# Patient Record
Sex: FEMALE | Race: WHITE
[De-identification: names, ages, dates, MRNs, and addresses within clinical notes are randomized per-mention and may not be internally consistent; named-entity substitution may affect disease eponyms.]

---

## 2021-02-09 ENCOUNTER — HOSPITAL ENCOUNTER (OUTPATIENT)
Dept: HOSPITAL 47 - RADUSWWP | Age: 61
Discharge: HOME | End: 2021-02-09
Attending: INTERNAL MEDICINE
Payer: OTHER GOVERNMENT

## 2021-02-09 DIAGNOSIS — R93.89: Primary | ICD-10-CM

## 2021-02-09 DIAGNOSIS — N93.9: ICD-10-CM

## 2021-02-09 PROCEDURE — 76856 US EXAM PELVIC COMPLETE: CPT

## 2021-02-09 NOTE — US
EXAMINATION TYPE: US pelvic complete

 

DATE OF EXAM: 2/9/2021

 

COMPARISON: NONE

 

CLINICAL HISTORY: N93.9 ABN UTERINE AND VAGINAL BLEEDING. Pt states 2 episodes of vaginal spotting, l
ast episode 1 week ago

 

TECHNIQUE:  Transabdominal (TA).  Transabdominal sonographic images of the pelvis were acquired.  

 

Date of LMP:  pt states 5-6 years ago

 

EXAM MEASUREMENTS:

 

Uterus:  9.2 x 3.4 x 4.7 cm

Endometrial Stripe: 0.6 cm

Right Ovary:  2.3 x 2.4 x 1.5 cm

Left Ovary:  2.2 x 1.8 x 1.3 cm

 

 

 

1. Uterus:  Anteverted   Heterogeneous

2. Endometrium:  Thickened for post menopausal female not on HRT's

3. Right Ovary:  wnl

4. Left Ovary:  wnl

5. Bilateral Adnexa:  wnl

6. Posterior cul-de-sac:  wnl

 

Suboptimal study in transvaginal investigation performed. Slight abnormal thickening of endometrium f
or postmenopausal female.

 

IMPRESSION: As above. Suboptimal study. Consider further investigation with transvaginal pelvic ultra
sound or endometrial sampling to rule out neoplasm.

## 2021-04-01 ENCOUNTER — HOSPITAL ENCOUNTER (OUTPATIENT)
Dept: HOSPITAL 47 - RADMAMWWP | Age: 61
Discharge: HOME | End: 2021-04-01
Attending: INTERNAL MEDICINE
Payer: OTHER GOVERNMENT

## 2021-04-01 DIAGNOSIS — Z12.31: Primary | ICD-10-CM

## 2021-04-01 DIAGNOSIS — Z78.0: ICD-10-CM

## 2021-04-01 PROCEDURE — 77067 SCR MAMMO BI INCL CAD: CPT

## 2021-04-01 NOTE — P.HPOB
History of Present Illness


H&P Date: 21


Chief Complaint: Postmenopausal bleeding, endometrial thickening





This is a 61 y.o. female,  2, para 2, who presents for dilatation and 

curettage with hysteroscopy due to postmenopausal bleeding and endometrial 

thickening on ultrasound.  Her bleeding began about a month ago when she wiped 

and she had a couple small clots over a few hours and then it stopped.  She had 

some spotting the next day and then a week later.  Pelvic US showed uterus 

measuring 9.2 x 3.4 x 4.7 cm, with an endometrial thickness of 6 mm.  Ovaries 

normal.  Her LMP was age 54-53.





OB Hx:  .  History of 2 vaginal deliveries.


Gyn Hx:  No history of STDs.








Review of Systems


Constitutional: Reports night sweats, Reports weight gain, Denies chills, Denies

fever


Eyes: denies blurred vision, denies pain


Ears, nose, mouth and throat: Denies headache, Denies sore throat


Cardiovascular: Denies chest pain, Denies shortness of breath


Respiratory: Denies cough


Gastrointestinal: Reports constipation, Denies abdominal pain


Genitourinary: Reports abnormal vaginal bleeding, Reports stress incontinence


Menstruation: Reports postmenopausal


Musculoskeletal: Reports low back pain


Neurological: Reports memory loss, Denies numbness, Denies weakness


Psychiatric: Reports anxiety, Reports depression, Reports difficulty 

concentrating


Endocrine: Reports flushing





Past Medical History


Past Medical History: Asthma, Cancer


Additional Past Medical History / Comment(s): melanoma rt leg


History of Any Multi-Drug Resistant Organisms: None Reported


Past Surgical History: Breast Surgery, Cholecystectomy


Additional Past Surgical History / Comment(s): melanoma wide incision rt leg.  

lumpectomy rt breast benign


Past Anesthesia/Blood Transfusion Reactions: Previous Problems w/ Anesthesia, 

Postoperative Nausea & Vomiting (PONV)


Additional Past Anesthesia/Blood Transfusion Reaction / Comment(s): slow to wake

up from anesthesia


Smoking Status: Current every day smoker





- Past Family History


  ** Mother


Family Medical History: Cancer, COPD, Diabetes Mellitus


Additional Family Medical History / Comment(s): kidney cancer





Medications and Allergies


                                Home Medications











 Medication  Instructions  Recorded  Confirmed  Type


 


Albuterol Inhaler [Ventolin Hfa 1 puff INHALATION DAILY PRN 21 

History





Inhaler]    


 


Aspirin [Adult Low Dose Aspirin EC] 81 mg PO DAILY 21 History


 


DULoxetine HCL [Cymbalta] 60 mg PO DAILY 21 History


 


lisinopriL [Lisinopril] 20 mg PO DAILY 21 History








                                    Allergies











Allergy/AdvReac Type Severity Reaction Status Date / Time


 


No Known Allergies Allergy   Verified 21 06:47














Exam


Osteopathic Statement: *.  No significant issues noted on an osteopathic 

structural exam other than those noted in the History and Physical/Consult.





HEENT:  within normal limits


Heart:  regular rate and rhythm


Lungs:  clear to auscultation bilaterally


Abdomen:  soft, non-tender


Pelvic:  Cevix-small friable polyp-removed; uterus small, anteverted, non-tender

 with no adnexal masses palpate


Extremities:  neg Tash's.





Assessment and Plan


(1) Postmenopausal bleeding


Current Visit: No   Status: Acute   Code(s): N95.0 - POSTMENOPAUSAL BLEEDING   

SNOMED Code(s): 44706788


   





(2) Endometrial thickening on ultrasound


Current Visit: No   Status: Acute   Code(s): R93.89 - ABNORMAL FINDINGS ON DX 

IMAGING OF OTH BODY STRUCTURES   SNOMED Code(s): 142625825


   


Plan: 





Proceed with dilatation and curettage with hysteroscopy.





I have discussed the risks, benefits, and alternative therapies for the above-

mentioned procedure and for both sedation/anesthesia as well as necessary blood 

products administration, if indicated, as they pertain to this patient.  The 

patient has indicated her understanding and acceptance of the risks and 

procedures discussed.

## 2021-04-02 ENCOUNTER — HOSPITAL ENCOUNTER (OUTPATIENT)
Dept: HOSPITAL 47 - OR | Age: 61
Discharge: HOME | End: 2021-04-02
Attending: OBSTETRICS & GYNECOLOGY
Payer: COMMERCIAL

## 2021-04-02 VITALS — SYSTOLIC BLOOD PRESSURE: 117 MMHG | DIASTOLIC BLOOD PRESSURE: 73 MMHG | HEART RATE: 98 BPM

## 2021-04-02 VITALS — TEMPERATURE: 97.6 F

## 2021-04-02 VITALS — BODY MASS INDEX: 34.3 KG/M2

## 2021-04-02 VITALS — RESPIRATION RATE: 16 BRPM

## 2021-04-02 DIAGNOSIS — Z87.42: ICD-10-CM

## 2021-04-02 DIAGNOSIS — Z83.3: ICD-10-CM

## 2021-04-02 DIAGNOSIS — Z98.890: ICD-10-CM

## 2021-04-02 DIAGNOSIS — N95.0: ICD-10-CM

## 2021-04-02 DIAGNOSIS — F17.210: ICD-10-CM

## 2021-04-02 DIAGNOSIS — J45.909: ICD-10-CM

## 2021-04-02 DIAGNOSIS — Z85.820: ICD-10-CM

## 2021-04-02 DIAGNOSIS — R00.1: ICD-10-CM

## 2021-04-02 DIAGNOSIS — Z79.51: ICD-10-CM

## 2021-04-02 DIAGNOSIS — Z79.82: ICD-10-CM

## 2021-04-02 DIAGNOSIS — Z79.899: ICD-10-CM

## 2021-04-02 DIAGNOSIS — N85.4: ICD-10-CM

## 2021-04-02 DIAGNOSIS — Z91.89: ICD-10-CM

## 2021-04-02 DIAGNOSIS — Z82.5: ICD-10-CM

## 2021-04-02 DIAGNOSIS — I10: ICD-10-CM

## 2021-04-02 DIAGNOSIS — Z87.898: ICD-10-CM

## 2021-04-02 DIAGNOSIS — Z90.49: ICD-10-CM

## 2021-04-02 DIAGNOSIS — Z80.51: ICD-10-CM

## 2021-04-02 DIAGNOSIS — N85.8: Primary | ICD-10-CM

## 2021-04-02 DIAGNOSIS — E78.5: ICD-10-CM

## 2021-04-02 PROCEDURE — 88305 TISSUE EXAM BY PATHOLOGIST: CPT

## 2021-04-02 PROCEDURE — 58558 HYSTEROSCOPY BIOPSY: CPT

## 2021-04-02 NOTE — P.OP
Date of Procedure: 21


Preoperative Diagnosis: 


Postmenopausal bleeding


Endometrial thickening


Postoperative Diagnosis: 


Same


Procedure(s) Performed: 


Dilation and curettage with hysteroscopy


Anesthesia: other (Mask general)


Surgeon: Anna Grimaldo


Estimated Blood Loss (ml): 2


Pathology: other (Endometrial curettings)


Condition: stable


Disposition: same day


Indications for Procedure: 


This is a 61 y.o. female,  2, para 2, who presents for dilatation and 

curettage with hysteroscopy due to postmenopausal bleeding and endometrial 

thickening on ultrasound.  Her bleeding began about a month ago when she wiped 

and she had a couple small clots over a few hours and then it stopped.  She had 

some spotting the next day and then a week later.  Pelvic US showed uterus 

measuring 9.2 x 3.4 x 4.7 cm, with an endometrial thickness of 6 mm.  Ovaries 

normal.  Her LMP was age 54-53.


Operative Findings: 


Uterus is mid position, sounded to 8 cm.  No adnexal masses are palpated.  Upon 

hysteroscopy, a very atrophic appearing pattern was noted.  Both tubal ostia are

visualized.  No polyps or fibroids are visualized.  Very scant endometrial 

tissue is obtained.


Description of Procedure: 


The patient is taken to the operating room where she is placed in the dorsal 

lithotomy position.  She is prepped and draped in the normal sterile fashion.  

Her bladder is drained with a catheter.  Examination is performed under 

anesthesia.  Uterus is to be mid position, with no adnexal masses palpated.  

Next a weighted speculum was placed in the patient's vagina and a right angle 

retractor was used to visualize the cervix.  The anterior lip of the cervix is 

grasped with a single-tooth tenaculum.  The uterus is then sounded to 8 cm.  

Cervix is gently dilated with Klein dilators until a hysteroscope could be 

passed.  Hysteroscopy was performed using normal saline.  The above-noted 

findings are made and pictures are taken.  Next the hysteroscope was withdrawn 

and the cervix gently dilated further.  Medium-size sharp curet was introduced 

and sharp curettage was performed until a gritty texture was noted.  Very scant 

endometrial tissue was obtained and removed from the field.  Next the single-

tooth tenaculum was removed and speculum was removed.  No bleeding is 

visualized.  All sponge counts are correct.  The patient is then taken to 

recovery room in stable condition.

## 2021-04-05 NOTE — MM
Reason for exam: screening  (asymptomatic).

Last mammogram was performed 4 years and 4 months ago.



History:

Patient is postmenopausal and has history of other cancer at age 56.

Benign excisional biopsy of the right breast, 2006.

Took hormonal contraceptives for 8 years.



Physical Findings:

A clinical breast exam by your physician is recommended on an annual basis and 

results should be correlated with mammographic findings.



MG Screening Mammo w CAD

Bilateral CC and MLO view(s) were taken.

Prior study comparison: December 15, 2016, bilateral MG screening mammo w CAD.

There are scattered fibroglandular densities.  There is chronic nodularity in the 

left breast, increasing in size, 9cm and 5cm from nipple.





ASSESSMENT: Incomplete: need additional imaging evaluation, BI-RAD 0



RECOMMENDATION:

Ultrasound of the left breast.



[facility] will attempt to contact patient  to return for ultrasound.

## 2021-04-13 ENCOUNTER — HOSPITAL ENCOUNTER (OUTPATIENT)
Dept: HOSPITAL 47 - RADUSWWP | Age: 61
Discharge: HOME | End: 2021-04-13
Attending: INTERNAL MEDICINE
Payer: OTHER GOVERNMENT

## 2021-04-13 DIAGNOSIS — Z78.0: Primary | ICD-10-CM

## 2021-04-13 NOTE — USB
Reason for exam: additional evaluation requested from abnormal screening.



History:

Patient is postmenopausal and has history of other cancer at age 56.

Benign excisional biopsy of the right breast, 2006.

Took hormonal contraceptives for 8 years.



Physical Findings:

Nurse did not find any significant physical abnormalities on exam.



US Breast Workup LT

Left complete breast ultrasound includes all four quadrants, the retroareolar 

region and axilla. Finding demonstrates a 11 x 4 x 9mm oval, mixed lesion at 9 

o'clock, possible lymph node versus duct or cyst with internal filling defect, 6 

month follow up to assess for any change and potential biopsy at that time, likely

mammographic correlate, seem to have been present in 2016 but slightly smaller 

and duct ectasia at the posterior nipple.





ASSESSMENT: Probably benign, BI-RAD 3



RECOMMENDATION:

Follow-up diagnostic mammogram and ultrasound of the left breast in 6 months.

## 2021-11-05 ENCOUNTER — HOSPITAL ENCOUNTER (OUTPATIENT)
Dept: HOSPITAL 47 - RADMAMWWP | Age: 61
Discharge: HOME | End: 2021-11-05
Attending: INTERNAL MEDICINE
Payer: OTHER GOVERNMENT

## 2021-11-05 DIAGNOSIS — R92.2: Primary | ICD-10-CM

## 2021-11-05 PROCEDURE — 77065 DX MAMMO INCL CAD UNI: CPT

## 2021-11-08 NOTE — USB
Reason for exam: follow-up at short interval from prior study.



History:

Patient is postmenopausal and has history of other cancer at age 56.

Benign excisional biopsy of the right breast, 2006.

Took hormonal contraceptives for 8 years.



US Breast Limited LT

Left limited breast ultrasound including focal area of concern, retroareolar and 

axilla demonstrates a 1.1 x 0.7 x 0.3cm oval, mixed lesion at 9 o'clock and duct 

ectasia at the posterior nipple. Left breast scanned 8-12 o'clock and 

nipple/axilla.



These results were verbally communicated with the patient and result sheet given 

to the patient on 11/5/21.





ASSESSMENT: Probably benign, BI-RAD 3



RECOMMENDATION:

Ultrasound of the left breast in 6 months.

## 2021-11-08 NOTE — MM
Reason for exam: follow-up at short interval from prior study.

Last mammogram was performed 7 months ago.



History:

Patient is postmenopausal and has history of other cancer at age 56.

Benign excisional biopsy of the right breast, 2006.

Took hormonal contraceptives for 8 years.



Physical Findings:

Nurse did not find any significant physical abnormalities on exam.



MG Diagnostic Mammo LT w CAD

CC and MLO view(s) were taken of the left breast.

Prior study comparison: April 1, 2021, bilateral MG screening mammo w CAD.  

December 15, 2016, bilateral MG screening mammo w CAD.

There is no discrete abnormality.

No significant new findings when compared with previous films.



These results were verbally communicated with the patient and result sheet given 

to the patient on 11/5/21.





ASSESSMENT: Benign, BI-RAD 2



RECOMMENDATION:

Return to routine screening mammogram schedule for both breasts.

Back on schedule.

## 2022-06-15 ENCOUNTER — HOSPITAL ENCOUNTER (OUTPATIENT)
Dept: HOSPITAL 47 - RADNMMAIN | Age: 62
Discharge: HOME | End: 2022-06-15
Attending: INTERNAL MEDICINE
Payer: COMMERCIAL

## 2022-06-15 DIAGNOSIS — R07.9: Primary | ICD-10-CM

## 2022-06-15 PROCEDURE — 93017 CV STRESS TEST TRACING ONLY: CPT

## 2022-06-15 NOTE — CA
Exercise Stress Test Report 

 

 Name:    Gloria Sage 

 

 MRN:    [id number] 

 

 Exam Date: 06/15/2022 11:15 

 

 Exam Location:      [place]  

                     Stress 

 

 Ht (in):     64     Wt (lb):     210    BSA:    2.00 

 

 Ordering Phys:       Jah Meehan MD 

 

 Referring Phys:      Neema Moss 

 

 Technologist:        Warren Chung 

 

 Age:    62    Gender:    F 

 

 :    1960 

 Procedure CPT: 

 

 Indications:              R07.9 CHEST PAIN 

 

 ICD-10 Codes: 

 

 Patient History:          DIZZINESS, PALPITATIONS, HTN, SOCIAL  

                           SMOKING HX OF GREATER THAN 45 YEARS, ASTHMA 

 

 Medications:              LISINOPRIL, ZYMBALTA, PROBIOTIC, ASA 81 mg 

 

 Meds past 24 hrs: 

 

 Pretest Chest Pain: 

 

 STRESS TEST      Misael 

 

 Protocol 

 

 

 

 

 Exercise Duration (min:sec):         04:25 

 Max ST Depressions (mm): 

 Angina Score: 

 Shipley Score: 

 Resting HR (bpm):      82 

 

 Peak HR (bpm):         152 

 

 Resting BP (mmHg):       127    /   72 

 

 Peak BP (mmHg):             /   81 

 

 MPHR:    160     Target HR:      136 

 

 % MPHR:     95 

 METS:  7.1 

 

 Total Dose: 

 Peak Dose: 

 Atropine: 

 Double Product: 

 

 BP Response: 

 

 Stress Termination:       Dyspnea Maximum heart rate obtained 

 

 Stress Symptoms: 

 DIZZINESS,DIFFICULTY IN BREATHING 

 

 Stress Summary: 

 

 

  ECG ANALYSIS 

 

 Resting ECG: 

 

 Stress ECG: 

 

 CONCLUSIONS 

 Baseline EKG revealed normal sinus rhythm without significant  

 ST-T changes.  Patient walked for 4 minutes 25 seconds on a  

 standard Misael protocol and achieved a maximal heart rate of 152  

 bpm which is more than 85% of predicted maximal.  She developed  

 fatigue and shortness of breath but did not have any angina.   

 She had shortness of breath and dizziness.  EKG did not reveal  

 any ST segment changes to indicate ischemia.  By EKG criteria  

 this is a negative stress test with limited exercise capacity 

 

 Dr. Verna Castaneda MD 

 (Electronically Signed) 

 Final Date:      15 Mae 2022 12:28

## 2023-04-14 ENCOUNTER — HOSPITAL ENCOUNTER (OUTPATIENT)
Dept: HOSPITAL 47 - RADCTMAIN | Age: 63
Discharge: HOME | End: 2023-04-14
Attending: INTERNAL MEDICINE
Payer: COMMERCIAL

## 2023-04-14 DIAGNOSIS — F17.210: ICD-10-CM

## 2023-04-14 DIAGNOSIS — Z12.2: Primary | ICD-10-CM

## 2023-04-14 PROCEDURE — 71271 CT THORAX LUNG CANCER SCR C-: CPT

## 2023-04-14 NOTE — CTL
EXAMINATION TYPE:  CT Low Dose Lung

 

DATE OF EXAM ORDERED: 4/14/2023

 

HISTORY: Z87.891. Lung cancer screening

 

CT DLP: 86 mGycm

CT CTDI: 2.49 mGy

Automated exposure control for dose reduction was used.

 

SCREENING VISIT: First screening visit

 

COMPARISON: None

 

TECHNIQUE: Low dose computed tomography scan was performed through the chest at 1 mm thick sections a
nd reconstructed images in multiple planes at 1 mm and 5 mm thick sections.

 

CT DIAGNOSTIC QUALITY: Satisfactory

 

FINDINGS:

LUNG NODULES: No clinically significant pulmonary nodule. 3 mm nodule along the left major fissure is
 favored to represent an intrafissural lymph node.

 

LUNGS:

COPD: Severity: None

Fibrosis: Severity: None

Lymph nodes: None

Other findings: None

 

RIGHT PLEURAL SPACE:

Effusion: None

Calcification: None

Thickening: None

Pneumothorax: None

 

LEFT PLEURAL SPACE:

Effusion: None

Calcification: None

Thickening: None

Pneumothorax: None

 

HEART:  

Heart Size: Normal

Coronary Calcification: None

Pericardial Effusion: None

 

OTHER FINDINGS:

Upper abdomen: Post cholecystectomy changes.

Bony thorax: None

Supraclavicular region: None

Other: None

 

IMPRESSION: No clinically significant pulmonary nodules.

 

CT LUNG RAD AND CT CHEST RECOMMENDATION: Lung-Rad 1 Negative: Continue annual screening with LDCT in 
12 months.

 

S Modifier (other clinically significant findings): None

## 2023-06-13 ENCOUNTER — HOSPITAL ENCOUNTER (OUTPATIENT)
Dept: HOSPITAL 47 - RADMAMWWP | Age: 63
Discharge: HOME | End: 2023-06-13
Attending: INTERNAL MEDICINE
Payer: COMMERCIAL

## 2023-06-13 DIAGNOSIS — R92.8: ICD-10-CM

## 2023-06-13 DIAGNOSIS — Z80.3: ICD-10-CM

## 2023-06-13 DIAGNOSIS — Z78.0: ICD-10-CM

## 2023-06-13 DIAGNOSIS — R10.9: ICD-10-CM

## 2023-06-13 DIAGNOSIS — Q78.2: Primary | ICD-10-CM

## 2023-06-13 DIAGNOSIS — I10: ICD-10-CM

## 2023-06-13 DIAGNOSIS — J45.909: ICD-10-CM

## 2023-06-13 PROCEDURE — 77066 DX MAMMO INCL CAD BI: CPT

## 2023-06-13 PROCEDURE — 77080 DXA BONE DENSITY AXIAL: CPT

## 2023-06-13 PROCEDURE — 77062 BREAST TOMOSYNTHESIS BI: CPT

## 2023-06-13 NOTE — MM
Reason for Exam: Follow-up at short interval from prior study. 

Last mammogram was performed 2 year(s) and 2 month(s) ago. 





Patient History: 

Menarche at age 14. First Full-Term Pregnancy at age 22. Postmenopausal. Other cancer, age 56.

Patient used Hormonal Contraceptives for 8 years. 2006, Benign Excisional Biopsy on the right side.

Maternal cousin had breast cancer at or over age 50. 





Risk Values: 

Kelsi 5 year model risk: 1.6%.

NCI Lifetime model risk: 7.5%.





Tissue Density: 

The breast tissue is heterogeneously dense. This may lower the sensitivity of mammography.





Findings: 

Analyzed By CAD. 

Stable chronic nodularity within the left breast. No new suspicious masses within either breast. No

worrisome group of calcifications within either breast. Stable postexcisional changes of the right

breast. 





Overall Assessment: Benign, BI-RAD 2





Management: 

Screening Mammogram of both breasts in 1 year.

A clinical breast exam by your physician is recommended on an annual basis and results should be

correlated with mammographic findings.  This exam should not preclude additional follow-up of

suspicious palpable abnormalities.  Results were given to the patient verbally at the time of exam.



Note on Kelsi scores and lifetime risk:

1. A Kelsi score greater than 3% is considered moderate risk. If this is the case, consider

specialist referral to assess eligibility for a risk reducing agent.

If overall lifetime risk for the development of breast cancer is 20% or higher, the patient may

qualify for future screening with alternating mammogram and breast MRI.



Electronically signed and approved by: Atilio Cheung D.O.

## 2023-06-13 NOTE — BD
EXAMINATION TYPE: Axial Bone Density

 

DATE OF EXAM: 6/13/2023

 

CLINICAL HISTORY: 63 years old Female.  ICD-10 CODE: Q78.2 OSTEOPETROSIS

 

Height:  62.0 inches

Weight:  192.0 pounds

 

FRAX RISK QUESTIONS:

Glucocorticoids (More than 3mos):  yes, asthma

           (Ex: prednisone, prednisolone, methylprednisolone, dexamethasone, and hydrocortisone).    
     

Current Tobacco Use: yes, vape

 

RISK FACTORS 

HISTORY OF: 

Postmenopausal woman: yes, at 52

Hyperparathyroidism: no

Adrenal Insufficiency: no

 

MEDICATIONS: 

Prednisone or other steroids: yes, for asthma for many yrs

Additional Medications: bp meds, Cymbalta, hx of ca,  

Additional History: hx of cancer rt leg melanoma, hypertension, arthritis pain, asthma   

 

EXAM MEASUREMENTS: 

Bone mineral densitometry was performed using the [employer].

Bone mineral density as measured about the Lumbar spine is:  

----- L1-L4(G/cm2): 1.206

T Score Values are as follows:

----- L1:   -1.6

----- L2:    0.6

----- L3:   1.2

----- L4:    0.4

----- L1-L4:    0.2

 

Z Score Values are as follows:

----- L1:   -1.0

----- L2:    1.1

----- L3:    1.7

----- L4:    1.0

----- L1-L4:    0.8

 

Bone mineral density is her first bone density at [facility]

 

Bone mineral density about the R hip (g/cm2):   1.116

Bone mineral density about the L hip (g/cm2):   1.231

T Score values are as follows:

-----R Neck:   0.2

-----L Neck:   0.3

-----R Total:   1.2

-----L Total:   1.8

 

Z Score values are as follows:

-----R Neck:   1.0

-----L Neck:   1.1

-----R Total:   1.7

-----L Total:   2.2

 

Bone mineral density is her first dexa study at [facility].

 

FRAX%s: The graph provided illustrates a 8.8% chance for a major osteoporotic fx and a 0.3% chance fo
r the hips probability for fx in 10 years time.

 

 

 

 

IMPRESSION:

Normal (Values between +1 and -1 indicate normal bone mass).  Consider repeating this study in 5 year
s or sooner if there is some new clinical indication.

 

NOTE:  T-SCORE=SD OF THE YOUNG ADULT MEAN.

## 2023-06-15 ENCOUNTER — HOSPITAL ENCOUNTER (OUTPATIENT)
Dept: HOSPITAL 47 - RADUSWWP | Age: 63
Discharge: HOME | End: 2023-06-15
Attending: INTERNAL MEDICINE
Payer: COMMERCIAL

## 2023-06-15 DIAGNOSIS — Z90.49: ICD-10-CM

## 2023-06-15 DIAGNOSIS — R10.9: Primary | ICD-10-CM

## 2023-06-15 PROCEDURE — 76700 US EXAM ABDOM COMPLETE: CPT

## 2023-06-15 NOTE — US
EXAMINATION TYPE: US abdomen complete

 

DATE OF EXAM: 6/15/2023

 

COMPARISON: NONE

 

CLINICAL INDICATION: Female, 63 years old with history of R10.9 ABDOMINAL PAIN; Chest pain x 6 months
, pt. describes it as similar to her GB pain. GB resected

 

TECHNIQUE: Multiple sonographic images of the abdomen are obtained.

 

FINDINGS:

 

EXAM MEASUREMENTS:

 

Liver Length:  16.2 cm   

Gallbladder Wall:  Surgically absent cm   

CBD:  1.1 cm

Spleen:  8.5 cm   

Right Kidney:  9.1x4.3x5.3 cm 

Left Kidney:  9.9x5.4x5.0 cm   

 

SONOGRAPHER NOTES:

 

Pancreas:  Tail obscured by overlying bowel gas

Liver:  upper limits  

Gallbladder:  Surgically absent

**Evidence for sonographic Beth's sign:  No

CBD:  enlarged 

Spleen:  wnl   

Right Kidney:  No hydronephrosis or masses seen   

Left Kidney:  No hydronephrosis or masses seen   

Upper IVC:  wnl  

Abd Aorta:  wnl

 

 

 

The liver is homogenous.  The intrahepatic portion of the IVC and proximal abdominal aorta are within
 normal limits.    The visualized portions of the pancreas are homogenous.  The spleen is unremarkabl
e.  Kidneys are symmetric and free of hydronephrosis.  No renal lesions are seen.

 

IMPRESSION: 

No significant abnormality at this time. The gallbladder surgically absent.

## 2025-01-16 ENCOUNTER — HOSPITAL ENCOUNTER (OUTPATIENT)
Dept: HOSPITAL 47 - RADCTMAIN | Age: 65
Discharge: HOME | End: 2025-01-16
Attending: INTERNAL MEDICINE
Payer: COMMERCIAL

## 2025-01-16 DIAGNOSIS — Z12.2: Primary | ICD-10-CM

## 2025-01-16 DIAGNOSIS — Z87.891: ICD-10-CM

## 2025-01-16 PROCEDURE — 71271 CT THORAX LUNG CANCER SCR C-: CPT

## 2025-01-17 NOTE — CTL
EXAMINATION TYPE: CT Low Dose Lung

 

DATE OF EXAM: 1/16/2025 11:23 AM

 

COMPARISON: 4/14/2023 

 

CLINICAL INDICATION: Female, 64 years old with history of Z12.2 LUNG CA SCR  Z87.891 FORMER SMOKER, ,
 Lung cancer screening, History of tobacco use.

 

TECHNIQUE: Low dose computed tomography scan was performed through the chest at 1 mm thick sections a
nd reconstructed images in the coronal plane at 1 mm thick sections.

Contrast used: mL of , (none if empty)

Oral contrast used: (none if empty)

CT DLP: Former smoker, 1 ppd x 31 years. Current vaper mGycm, Automated exposure control for dose red
uction was used.

CT CTDI: 2.6 mGy, Automated exposure control for dose reduction was used.

SCREENING VISIT: Subsequent

CT DIAGNOSTIC QUALITY: Satisfactory

 

FINDINGS:

LUNG NODULES: None.

 

 

LUNGS:

COPD: Severity: None

Fibrosis: Severity: None

Lymph nodes: None

Other findings: None

 

RIGHT PLEURAL SPACE:

Effusion: None

Calcification: None

Thickening: None

Pneumothorax: None

 

LEFT PLEURAL SPACE:

Effusion: None

Calcification: None

Thickening: None

Pneumothorax: None

 

HEART:  

Other: Ascending thoracic aorta at the level the main pulmonary artery measures 3.3 cm.  The main pul
monary artery at the bifurcation measures2.6 cm.

Heart Size: Normal

Coronary calcification: None

Pericardial effusion: None

 

OTHER FINDINGS:

Upper abdomen: Normal

Bony thorax: Normal

Supraclavicular region: Normal

 

IMPRESSION: 

 

1. No suspicious abnormalities to suggest primary or metastatic neoplasm.

 

FOLLOW UP CT CHEST RECOMMENDATION: Follow-up low-dose CT chest one year

CT LUNG RAD: Lung-Rad 2 Benign Appearance or Behavior

 

[facility], Workstation: [employer], 1/17/2025 9:06 PM